# Patient Record
Sex: FEMALE | Race: WHITE | HISPANIC OR LATINO | Employment: FULL TIME | URBAN - METROPOLITAN AREA
[De-identification: names, ages, dates, MRNs, and addresses within clinical notes are randomized per-mention and may not be internally consistent; named-entity substitution may affect disease eponyms.]

---

## 2024-09-09 NOTE — PROGRESS NOTES
"Ambulatory Visit  Name: Ebony Alvarez      : 1990      MRN: [id number]  Encounter Provider: Melvin Horton DO  Encounter Date: 2024   Encounter department: [facility]    Assessment & Plan   1. Chronic nonintractable headache, unspecified headache type         Patient is a 34-year-old female new to me who is presenting today with concerns of frequent headaches.    History of Present Illness   {Disappearing Hyperlinks I Encounters * My Last Note * Since Last Visit * History :47768}  HPI    Review of Systems   Constitutional:  Negative for fever.   Eyes:  Negative for visual disturbance.   Respiratory:  Negative for shortness of breath.    Cardiovascular:  Negative for chest pain.   Gastrointestinal:  Negative for abdominal pain.   Genitourinary:  Negative for difficulty urinating.   Skin:  Negative for rash.   Neurological:  Positive for headaches.     Patient presents for evaluation of headache. Symptoms began about {1-10:42845} {time; units:24235} ago. Generally, the headaches last about {1-10:16239} {time units:43122::\"hours\"} and occur {frequencies:17766}. The headaches {time related comments:18238}. The headaches are usually {headache description:70455} and are located in ***.  The patient rates her most severe headaches a {1-10:98961} on a scale from 1 to 10. Recently, the headaches have been {severity change:25325}. Work attendance or other daily activities {are/are not:68539} affected by the headaches. Precipitating factors include: {headache precipitants:18253}. The headaches are usually {aura comments:48026}. Associated neurologic symptoms: {headache neurologic symptoms default:92478}. The patient denies {headache neurologic symptoms:57504}. Home treatment has included {headache treatment:01128} with {no/little/some:80339} improvement. Other history includes: {headache hx:69302}. Family history includes {headache " family history:66446}.    Objective   {Disappearing Hyperlinks   Review Vitals * Enter New Vitals * Results Review * Labs * Imaging * Cardiology * Procedures * Lung Cancer Screening :59582}  There were no vitals taken for this visit.    Physical Exam  Vitals and nursing note reviewed.   Constitutional:       General: She is not in acute distress.     Appearance: Normal appearance. She is well-developed.   HENT:      Head: Normocephalic and atraumatic.      Right Ear: External ear normal.      Left Ear: External ear normal.      Nose: Nose normal.      Mouth/Throat:      Mouth: Mucous membranes are moist.   Eyes:      Extraocular Movements: Extraocular movements intact.      Conjunctiva/sclera: Conjunctivae normal.      Pupils: Pupils are equal, round, and reactive to light.   Cardiovascular:      Rate and Rhythm: Normal rate and regular rhythm.      Heart sounds: Normal heart sounds. No murmur heard.  Pulmonary:      Effort: Pulmonary effort is normal. No respiratory distress.      Breath sounds: Normal breath sounds. No wheezing.   Musculoskeletal:         General: Normal range of motion.      Cervical back: Normal range of motion.   Skin:     General: Skin is warm and dry.      Findings: No rash.   Neurological:      General: No focal deficit present.      Mental Status: She is alert and oriented to person, place, and time. Mental status is at baseline.      Cranial Nerves: No cranial nerve deficit.   Psychiatric:         Mood and Affect: Mood normal.         Behavior: Behavior normal.         Thought Content: Thought content normal.         Judgment: Judgment normal.       Administrative Statements {Disappearing Hyperlinks I  Level of Service * [facility]/PCSP:91311}  I have spent a total time of 20 minutes in caring for this patient on the day of the visit/encounter including Instructions for management, Documenting in the medical record, and Obtaining or reviewing history  .

## 2024-09-13 ENCOUNTER — OFFICE VISIT (OUTPATIENT)
Dept: FAMILY MEDICINE CLINIC | Facility: CLINIC | Age: 34
End: 2024-09-13

## 2024-09-13 VITALS
HEIGHT: 62 IN | OXYGEN SATURATION: 99 % | DIASTOLIC BLOOD PRESSURE: 72 MMHG | HEART RATE: 52 BPM | BODY MASS INDEX: 25.95 KG/M2 | SYSTOLIC BLOOD PRESSURE: 100 MMHG | WEIGHT: 141 LBS

## 2024-09-13 DIAGNOSIS — R51.9 ACUTE NONINTRACTABLE HEADACHE, UNSPECIFIED HEADACHE TYPE: ICD-10-CM

## 2024-09-13 DIAGNOSIS — Z12.4 CERVICAL CANCER SCREENING: ICD-10-CM

## 2024-09-13 DIAGNOSIS — Z00.00 ANNUAL PHYSICAL EXAM: Primary | ICD-10-CM

## 2024-09-13 DIAGNOSIS — Z23 ENCOUNTER FOR IMMUNIZATION: ICD-10-CM

## 2024-09-13 DIAGNOSIS — E66.3 OVERWEIGHT (BMI 25.0-29.9): ICD-10-CM

## 2024-09-13 PROBLEM — E87.6 HYPOKALEMIA: Status: ACTIVE | Noted: 2024-09-11

## 2024-09-13 PROCEDURE — 99385 PREV VISIT NEW AGE 18-39: CPT | Performed by: STUDENT IN AN ORGANIZED HEALTH CARE EDUCATION/TRAINING PROGRAM

## 2024-09-13 NOTE — ASSESSMENT & PLAN NOTE
Patient states that she was in the ER recently for migraine.  She had a workup including imaging and blood testing.  She was placed on Imitrex and recommended to start Cymbalta but feels that she does not need a medication for anxiety.  The Imitrex does help with her symptoms.  Patient should keep a log of triggers for her migraines.  She is going to have a follow-up with neurology to discuss her headaches further.

## 2024-09-13 NOTE — PROGRESS NOTES
Adult Annual Physical  Name: Ebony Alvarez      : 1990      MRN: [id number]  Encounter Provider: Melvin Horton DO  Encounter Date: 2024   Encounter department: [facility]    Assessment & Plan  Annual physical exam  Reviewed recent labs from hospital stay for headaches.       Cervical cancer screening  Will refer over to GYN for an evaluation.  Patient would like a local gynecologist near this area.  Orders:    Ambulatory referral to Obstetrics / Gynecology; Future    Acute nonintractable headache, unspecified headache type  Patient states that she was in the ER recently for migraine.  She had a workup including imaging and blood testing.  She was placed on Imitrex and recommended to start Cymbalta but feels that she does not need a medication for anxiety.  The Imitrex does help with her symptoms.  Patient should keep a log of triggers for her migraines.  She is going to have a follow-up with neurology to discuss her headaches further.       Encounter for immunization  Patient reports previous tetanus vaccine.  Deferred today       Overweight (BMI 25.0-29.9)    Orders:    Lipid Panel with Direct LDL reflex; Future    Basic metabolic panel; Future    Immunizations and preventive care screenings were discussed with patient today. Appropriate education was printed on patient's after visit summary.    Counseling:  Dental Health: discussed importance of regular tooth brushing, flossing, and dental visits.  Exercise: the importance of regular exercise/physical activity was discussed. Recommend exercise 3-5 times per week for at least 30 minutes.       Depression Screening and Follow-up Plan: Patient was screened for depression during today's encounter. They screened negative with a PHQ-2 score of 0.        History of Present Illness     Adult Annual Physical:  Patient presents for annual physical.     Diet and Physical Activity:  -  "Diet/Nutrition:. intermittent fast, home columbian dish or pancakes, eggs; lunch: protein; weekends eating more; fruits a lot, little veggies  - Exercise:. jogging, run 3-4 times a week    Depression Screening:  - PHQ-2 Score: 0    General Health:  - Sleep: sleeps well.  - Hearing: normal hearing bilateral ears.  - Vision: no vision problems.  - Dental: regular dental visits.    /GYN Health:  - Follows with GYN: no.     Review of Systems   Constitutional:  Negative for fever.   Respiratory:  Negative for shortness of breath.    Cardiovascular:  Negative for chest pain.   Gastrointestinal:  Negative for abdominal pain, constipation and diarrhea.   Genitourinary:  Negative for difficulty urinating.   Skin:  Negative for rash.   Neurological:  Positive for headaches (Intermittent.).       Objective     /72 (BP Location: Left arm, Patient Position: Sitting, Cuff Size: Standard)   Pulse (!) 52   Ht 5' 2\" (1.575 m)   Wt 64 kg (141 lb)   SpO2 99%   BMI 25.79 kg/m²     Physical Exam  Vitals and nursing note reviewed.   Constitutional:       General: She is not in acute distress.     Appearance: Normal appearance. She is well-developed.   HENT:      Head: Normocephalic and atraumatic.      Right Ear: Tympanic membrane, ear canal and external ear normal.      Left Ear: Tympanic membrane, ear canal and external ear normal.      Nose: Nose normal. No congestion.      Mouth/Throat:      Mouth: Mucous membranes are moist.      Pharynx: No oropharyngeal exudate or posterior oropharyngeal erythema.   Eyes:      Extraocular Movements: Extraocular movements intact.      Conjunctiva/sclera: Conjunctivae normal.      Pupils: Pupils are equal, round, and reactive to light.   Cardiovascular:      Rate and Rhythm: Normal rate and regular rhythm.      Heart sounds: Normal heart sounds. No murmur heard.  Pulmonary:      Effort: Pulmonary effort is normal. No respiratory distress.      Breath sounds: Normal breath sounds. No " wheezing.   Abdominal:      General: Abdomen is flat.      Palpations: Abdomen is soft.      Tenderness: There is no abdominal tenderness. There is no guarding.   Musculoskeletal:         General: Normal range of motion.      Cervical back: Normal range of motion and neck supple.   Lymphadenopathy:      Cervical: No cervical adenopathy.   Skin:     General: Skin is warm and dry.      Findings: No rash.   Neurological:      General: No focal deficit present.      Mental Status: She is alert and oriented to person, place, and time. Mental status is at baseline.   Psychiatric:         Mood and Affect: Mood normal.         Behavior: Behavior normal.         Thought Content: Thought content normal.         Judgment: Judgment normal.